# Patient Record
Sex: MALE
[De-identification: names, ages, dates, MRNs, and addresses within clinical notes are randomized per-mention and may not be internally consistent; named-entity substitution may affect disease eponyms.]

---

## 2019-06-02 ENCOUNTER — HOSPITAL ENCOUNTER (EMERGENCY)
Dept: HOSPITAL 42 - ED | Age: 24
Discharge: HOME | End: 2019-06-02
Payer: SELF-PAY

## 2019-06-02 VITALS
DIASTOLIC BLOOD PRESSURE: 72 MMHG | TEMPERATURE: 98.6 F | OXYGEN SATURATION: 99 % | RESPIRATION RATE: 18 BRPM | SYSTOLIC BLOOD PRESSURE: 125 MMHG | HEART RATE: 72 BPM

## 2019-06-02 VITALS — BODY MASS INDEX: 21.9 KG/M2

## 2019-06-02 DIAGNOSIS — K64.8: Primary | ICD-10-CM

## 2019-06-02 NOTE — ED PDOC
Arrival/HPI





- General


Chief Complaint: Male Genitourinary


Historian: Patient





- History of Present Illness


Narrative History of Present Illness (Text): 





06/02/19 19:09


24yo male with no past medical history who present with complaint of hemorrhoid 

pain x one week. States pain started after straining during a bowel movement. 

Notes intermittent history of constipation. Describes pain as burning during 

bowel movement. Denies bleeding, abdominal pain, fever, chills, discharge, any 

other complaint.





Past Medical History





- Provider Review


Nursing Documentation Reviewed: Yes





- Pulmonary


Hx Asthma: Yes





- Psychiatric


Hx Substance Use: No





Family/Social History





- Physician Review


Nursing Documentation Reviewed: Yes


Family/Social History: Unknown Family HX


Smoking Status: Never Smoked


Hx Alcohol Use: Yes


Frequency of alcohol use: Socially


Hx Substance Use: No





Allergies/Home Meds


Allergies/Adverse Reactions: 


Allergies





No Known Allergies Allergy (Verified 06/02/19 18:52)


   











Review of Systems





- Physician Review


All systems were reviewed & negative as marked: Yes





- Review of Systems


Constitutional: Normal


Eyes: Normal


ENT: Normal


Respiratory: Normal


Cardiovascular: Normal


Gastrointestinal: Other (Hemorrhoid)


Genitourinary Male: Normal


Musculoskeletal: Normal


Skin: Normal


Neurological: Normal


Endocrine: Normal


Hemo/Lymphatic: Normal


Psychiatric: Normal





Physical Exam


Vital Signs Reviewed: Yes





Vital Signs











  Temp Pulse Resp BP Pulse Ox


 


 06/02/19 18:53  98.6 F  72  18  125/72  99











Temperature: Afebrile


Blood Pressure: Normal


Pulse: Regular


Respiratory Rate: Normal


Appearance: Positive for: Well-Appearing, Non-Toxic, Comfortable


Pain Distress: None


Mental Status: Positive for: Alert and Oriented X 3





- Systems Exam


Head: Present: Atraumatic, Normocephalic


Pupils: Present: PERRL


Extroacular Muscles: Present: EOMI


Conjunctiva: Present: Normal


Mouth: Present: Moist Mucous Membranes


Neck: Present: Normal Range of Motion


Respiratory/Chest: Present: Clear to Auscultation, Good Air Exchange.  No: 

Respiratory Distress, Accessory Muscle Use


Cardiovascular: Present: Regular Rate and Rhythm, Normal S1, S2.  No: Murmurs


Abdomen: No: Tenderness, Distention, Peritoneal Signs


Rectal: Present: Hemorrhoids (Small nonstrangulated/nonbleeding internal 

hemorrhoid noted at 9 oclock position.).  No: Rectal Tenderness


Back: Present: Normal Inspection


Upper Extremity: Present: Normal Inspection.  No: Cyanosis, Edema


Lower Extremity: Present: Normal Inspection.  No: Edema


Neurological: Present: GCS=15, CN II-XII Intact, Speech Normal


Skin: Present: Warm, Dry, Normal Color.  No: Rashes


Psychiatric: Present: Alert, Oriented x 3, Normal Insight, Normal Concentration





Disposition/Present on Arrival





- Present on Arrival


Any Indicators Present on Arrival: No


History of DVT/PE: No


History of Uncontrolled Diabetes: No


Urinary Catheter: No


History of Decub. Ulcer: No


History Surgical Site Infection Following: None





- Disposition


Have Diagnosis and Disposition been Completed?: Yes


Diagnosis: 


 Hemorrhoid





Disposition: HOME/ ROUTINE


Disposition Time: 19:25


Patient Plan: Discharge


Condition: STABLE


Discharge Instructions (ExitCare):  Hemorrhoids (DC)


Additional Instructions: 


Increase your fiber intake


Do warm sitz baths


Apply cream as directed


Follow up with a Surgeon


Prescriptions: 


Acetaminophen [Tylenol Extra Strength] 500 mg PO Q6 #20 tablet


Hard Fat/Phenylephrine Hydro [Anusol Suppository] 1 sup RC TID #100 sup


Polyethylene Glycol 3350 [Miralax] 119 gm PO BID #1 powder


Referrals: 


Conchis Fragoso MD [Staff Provider] - Follow up with primary


[facility] [Outside] - Follow up with primary